# Patient Record
(demographics unavailable — no encounter records)

---

## 2022-06-08 NOTE — P.HPIM
History of Present Illness


H&P Date: 06/08/22





The patient is a 47-year-old male with no known PMH who presents to the 

emergency room for right hand pain and swelling after a cat bite. The patient 

reports that his pet cat of several years bit him on his right hand yesterday at

around 8 PM.  He initially had mild soreness in the area but didn't think much 

of it and washed the wound.  He woke up this morning to gradually worsening 

right hand pain, and redness extending in a streak up to his armpit.  He reports

8 out of 10 pain at the right hand.  Denies fever, chest pain, shortness of 

breath, nausea, vomiting.  Reports no history of cat bites in the past.  

Laboratory evaluation was remarkable for leukocytosis of 13.2.





Review of systems:


Pertinent positives and negatives as discussed in HPI, a complete review of 

systems was performed and all other systems are negative.





Physical examination:


General: non toxic, no distress, appears at stated age, normal weight


Derm: Right hand 2 small puncture wounds at the base of the thumb with kobi

rounding swelling and erythema with streak-like extension up to armpit, warm


Head: atraumatic, normocephalic, symmetric


Eyes: EOMI, no lid lag, anicteric sclera, pupils equal round reactive to light


ENT: Nose and ears atraumatic


Neck: No cervical lymphadenopathy, trachea midline, supple


Mouth: no lip lesion, mucus membranes moist


Cardiovascular: S1S2 reg, no murmur, positive dorsalis pedis pulse bilateral, no

edema


Lungs: CTA bilateral, no rhonchi, no rales, no accessory muscle use


Abdominal: soft,  nontender to palpation, no guarding


Ext: muscle strength 5 out of 5 in all 4 extremities grossly, no gross muscle 

atrophy, no contractures, 


Neuro:  CN II-XI grossly intact, no gross focal neuro deficits


Psych: Alert, oriented, appropriate affect 





Assessment/plan





Right hand cat bite with subsequent cellulitis and lymphangitis


-Continue with Unasyn 3 g every 6 hours


-Follow up blood cultures


-Pain control





DVT prophylaxis


-Heparin subcu





The patient is admitted with an anticipated less than 2 midnight stay for 

evaluation of cat bite


CODE STATUS: Full Code


Discussed with: Patient


Anticipated discharge date: in am


Anticipated discharge place: Home








Past Medical History


Past Medical History: No Reported History


History of Any Multi-Drug Resistant Organisms: None Reported, MRSA


Date of last positivie culture/infection: thigh


MDRO Source:: 2010


Past Surgical History: No Surgical Hx Reported


Past Psychological History: No Psychological Hx Reported


Smoking Status: Never smoker


Past Alcohol Use History: None Reported


Past Drug Use History: None Reported





- Past Family History


  ** Father


Family Medical History: Cancer





Medications and Allergies


                                    Allergies











Allergy/AdvReac Type Severity Reaction Status Date / Time


 


No Known Allergies Allergy   Verified 06/08/22 18:08














Physical Exam


Vitals: 


                                   Vital Signs











  Temp Pulse Resp BP Pulse Ox


 


 06/08/22 20:43   51 L  18  131/81  99


 


 06/08/22 15:19  98.5 F  65  16  122/76  96








                                Intake and Output











 06/08/22 06/08/22 06/08/22





 06:59 14:59 22:59


 


Other:   


 


  Weight   74.843 kg














Results


CBC & Chem 7: 


                                 06/08/22 17:09





                                 06/08/22 17:09


Labs: 


                  Abnormal Lab Results - Last 24 Hours (Table)











  06/08/22 06/08/22 Range/Units





  17:09 17:09 


 


WBC  13.2 H   (3.8-10.6)  k/uL


 


Neutrophils #  10.0 H   (1.3-7.7)  k/uL


 


Sodium   135 L  (137-145)  mmol/L

## 2022-06-08 NOTE — ED
Animal Bite HPI





- General


Chief Complaint: Animal Bite


Stated Complaint: cat bite to arm


Time Seen by Provider: 22 17:06


Source: patient, RN notes reviewed


Mode of arrival: ambulatory


Limitations: no limitations





- History of Present Illness


Initial Comments: 


This is a 47-year-old male who presents to the emergency department for a cat 

bite to the right hand.  Patient states that he was at his neighbor's house 

yesterday, when a dog was walking by and began to bark, causing the cat to bite 

his right hand and arm.  He is unsure if the neighbor's cat has been vaccinated.

 Patient also notes that he has been feeling lightheaded and has had a headache 

since this morning.  Describes the headache as being on both sides of his head. 

Denies any history of migraines.  Denies any visual changes or auras.  States 

that the headache and lightheadedness were the main reasons he came to the 

emergency department.





Denies any fevers, chills, sore throat, cough, dyspnea, chest pain, 

palpitations, abdominal pain, nausea, vomiting, diarrhea, or back pain.





MD Complaint: animal bite


Onset/Timin


-: days(s)


Right: Hand


Animal: cat


Description: household pet, immunizations unknown


Mechanism: bite


Associated Symptoms: erythema, rash, headache





- Related Data


                                    Allergies











Allergy/AdvReac Type Severity Reaction Status Date / Time


 


No Known Allergies Allergy   Verified 22 18:08














Review of Systems


ROS Statement: 


Those systems with pertinent positive or pertinent negative responses have been 

documented in the HPI.





ROS Other: All systems not noted in ROS Statement are negative.





Past Medical History


Past Medical History: No Reported History


History of Any Multi-Drug Resistant Organisms: None Reported, MRSA


Date of last positivie culture/infection: thigh


MDRO Source:: 


Past Surgical History: No Surgical Hx Reported


Past Psychological History: No Psychological Hx Reported


Smoking Status: Never smoker


Past Alcohol Use History: None Reported


Past Drug Use History: None Reported





General Exam


Limitations: no limitations


General appearance: alert, in no apparent distress


Head exam: Present: atraumatic, normocephalic, normal inspection


Respiratory exam: Present: normal lung sounds bilaterally.  Absent: respiratory 

distress, wheezes, rales, rhonchi, stridor


Cardiovascular Exam: Present: regular rate, normal rhythm, normal heart sounds. 

Absent: systolic murmur, diastolic murmur, rubs, gallop, clicks


Neurological exam: Present: alert, oriented X3, CN II-XII intact


Psychiatric exam: Present: normal affect, normal mood


Skin exam: Present: other (Puncture wound to the medial aspect of the right 

thumb with surrounding swelling and erythema.  The erythema extends proximally 

and terminates at the right axilla.  The erythema is associated with increased 

heat.)





Course


                                   Vital Signs











  22





  15:19 20:43


 


Temperature 98.5 F 


 


Pulse Rate 65 51 L


 


Respiratory 16 18





Rate  


 


Blood Pressure 122/76 131/81


 


O2 Sat by Pulse 96 99





Oximetry  














Medical Decision Making





- Medical Decision Making


This is a 47-year-old male who presents to the emergency department for a cat 

bite.  Lab work revealed an elevated white blood cell count, which is expected 

given the infection.  He was given a dose of Augmentin in the emergency 

department and his tetanus status was updated.  After I was discussing this with

the patient, he inquired about medication to treat his headache.  Patient states

that the headache began earlier today and he reports associated lightheadedness.

 States that the headache and lightheadedness were the main reason he decided to

come in.  Patient given IM Toradol and 6 mg of Decadron.  After further 

discussion with Dr. Zhou and the patient, it was decided that admission would 

be the best option, given that cat bites are prone to antibiotic failure and can

become severe rather quickly.  Patient started on 3 g of IV Unasyn.  Will plan 

to admit the patient for observation.





This case was discussed in detail with the attending ED physician. Presentation,

findings, and treatment plan discussed in detail as well. 





- Lab Data


Result diagrams: 


                                 22 17:22 17:09


                                   Lab Results











  22 Range/Units





  17:09 17: 17:09 


 


WBC  13.2 H    (3.8-10.6)  k/uL


 


RBC  4.84    (4.30-5.90)  m/uL


 


Hgb  14.9    (13.0-17.5)  gm/dL


 


Hct  46.4    (39.0-53.0)  %


 


MCV  95.9    (80.0-100.0)  fL


 


MCH  30.8    (25.0-35.0)  pg


 


MCHC  32.1    (31.0-37.0)  g/dL


 


RDW  12.2    (11.5-15.5)  %


 


Plt Count  223    (150-450)  k/uL


 


MPV  8.0    


 


Neutrophils %  76    %


 


Lymphocytes %  13    %


 


Monocytes %  7    %


 


Eosinophils %  2    %


 


Basophils %  1    %


 


Neutrophils #  10.0 H    (1.3-7.7)  k/uL


 


Lymphocytes #  1.7    (1.0-4.8)  k/uL


 


Monocytes #  0.9    (0-1.0)  k/uL


 


Eosinophils #  0.2    (0-0.7)  k/uL


 


Basophils #  0.1    (0-0.2)  k/uL


 


Sodium   135 L   (137-145)  mmol/L


 


Potassium   4.2   (3.5-5.1)  mmol/L


 


Chloride   104   ()  mmol/L


 


Carbon Dioxide   24   (22-30)  mmol/L


 


Anion Gap   7   mmol/L


 


BUN   12   (9-20)  mg/dL


 


Creatinine   0.79   (0.66-1.25)  mg/dL


 


Est GFR (CKD-EPI)AfAm   >90   (>60 ml/min/1.73 sqM)  


 


Est GFR (CKD-EPI)NonAf   >90   (>60 ml/min/1.73 sqM)  


 


Glucose   94   (74-99)  mg/dL


 


Plasma Lactic Acid Yuval    0.8  (0.7-2.0)  mmol/L


 


Calcium   9.1   (8.4-10.2)  mg/dL


 


Total Bilirubin   0.9   (0.2-1.3)  mg/dL


 


AST   21   (17-59)  U/L


 


ALT   17   (4-49)  U/L


 


Alkaline Phosphatase   48   ()  U/L


 


Total Protein   7.6   (6.3-8.2)  g/dL


 


Albumin   4.5   (3.5-5.0)  g/dL














Disposition


Clinical Impression: 


 Cat bite





Disposition: ADMITTED AS IP TO [facility]

## 2022-06-09 NOTE — P.PN
Subjective


Progress Note Date: 06/09/22





Hospital course:





Patient is a 47-year-old male with no known past medical history.  He presented 

to the emergency department with a chief complaint of swelling and pain to right

and status post cat bite.  Patient reports his cat and dog were fighting and he 

tried to break it up and the cat bit his hand.  who presents to the emergency 

room for right hand pain and swelling after a cat bite. The patient reports that

his pet cat of several years bit him on his right hand yesterday at around 8 PM.

 He initially had mild soreness in the area but didn't think much of it and 

washed the wound.  He woke up this morning to gradually worsening right hand 

pain, and redness extending in a streak up to his armpit.  He reports 8 out of 

10 pain at the right hand.  Denies fever, chest pain, shortness of breath, 

nausea, vomiting.  Reports no history of cat bites in the past.  Laboratory eval

uation was remarkable for leukocytosis of 13.2.





Physical exam:





Patient was evaluated at bedside this morning.  Patient continues to have 

moderate erythema and edema to right hand and thumb.  Secondary to significant  

pain and swelling to MCP joint of R thumb, consult will be placed for Ortho hand

specialist. 





Vital signs reviewed and stable. 


General: Nontoxic, no distress and appears stated age.  


Derm: Skin warm and dry, normal coloration for ethnicity.  Patient with moderate

erythema and edema to right hand and thumb significant pain and swelling to MCP 

joint of R thumb.  patient does have full movement and able to flex and extend 

we'll at this time.  Patient does report significant increase of pain remains 

able to make a fist and move per usual.  Sensation is intact.  


Head: Atraumatic, normocephalic and symmetric.  


Eyes: EOMs intact, no lid lag, and anicteric sclera


Mouth: no lip lesions, mucus membranes moist


Cardiovascular: regular rate and rhythm with normal S1S2, no murmur, positive 

posterior tibial pulses bilaterally, and cap refill < 2 seconds.  


Lungs: Respirations even, regular, and unlabored on room air. Lungs CTA 

bilaterally, no rhonchi, no rales, no wheezing, and no accessory muscle usage. 


Abdominal: soft, nontender to palpation, no guarding, no appreciable 

organomegaly


Ext: ROM intact. No gross muscle atrophy, no edema, no contractures


Neuro: Speech clear, face symmetrical and CN II-XII grossly intact with no noted

focal neuro deficits


Psych: Alert and oriented to person, place, time, and situation. Appropriate and

pleasant affect. 





Assessment and Plan of Care:





Cellulitis right hand and thumb secondary to cat bite


-Continue IV antibiotic Unasyn, plans to transition to oral Augmentin show some 

improvement 


-Consult Ortho-hand specialist for evaluation secondary to moderate swelling of 

MCP joint of R thumb


-Symptomatic care and pain management


-Follow up on blood cultures 








CODE STATUS: Full code


DVT prophylaxis: Heparin


Discussed with: Patient and RN


Anticipated discharge date: Possibly tomorrow pending improvement


Anticipated discharge place: Home


A total of 36 minutes was spent on the care of this complex patient more than 

50% of the time was spent in counseling and care coordination.








I reviewed the documentation as provided by the CURT above, who is the original 

author of this note.  I agree with the documented assessment and plan, with the 

following changes: none





Objective





- Vital Signs


Vital signs: 


                                   Vital Signs











Temp  97.7 F   06/09/22 12:30


 


Pulse  60   06/09/22 12:30


 


Resp  16   06/09/22 12:30


 


BP  128/74   06/09/22 12:30


 


Pulse Ox  100   06/09/22 12:30


 


FiO2      








                                 Intake & Output











 06/08/22 06/09/22 06/09/22





 18:59 06:59 18:59


 


Weight 74.843 kg  74.843 kg














- Labs


CBC & Chem 7: 


                                 06/09/22 09:45





                                 06/08/22 17:09


Labs: 


                  Abnormal Lab Results - Last 24 Hours (Table)











  06/08/22 06/08/22 06/09/22 Range/Units





  17:09 17:09 09:45 


 


WBC  13.2 H   13.0 H  (3.8-10.6)  k/uL


 


Neutrophils #  10.0 H   11.0 H  (1.3-7.7)  k/uL


 


Lymphocytes #    0.9 L  (1.0-4.8)  k/uL


 


Sodium   135 L   (137-145)  mmol/L

## 2022-06-10 NOTE — P.DS
Providers


Date of admission: 


06/08/22 21:37





Expected date of discharge: 06/10/22


Attending physician: 


To Golden MD





Consults: 





                                        





06/09/22 19:11


Consult Physician Routine 


   Consulting Provider: Kendra Gonzalez


   Consult Reason/Comments:  not on call, cat bite to r thumb, per order


   Do you want consulting provider notified?: Yes











Primary care physician: 


Stated None





Hospital Course: 





Discharge Diagnosis:





Cellulitis right hand and thumb secondary to cat bite. Pt received >24 hours of 

IV antibiotics and has shown significant improvement. Pt also seen by orthopedic

specialist and has been recommended to apply warm compresses and follow up in 

their office as needed. Pt medically stable and being discharged home on 

Augmentin 875/125 mg tablets twice daily x 10 days. During assessment this 

morning, pt was educated on importance of completing entire course of 

antibiotics and verbalized understanding. 





Hospital Course: 





Patient is a 47-year-old male with no known past medical history.  He presented 

to the emergency department with a chief complaint of swelling and pain to right

and status post cat bite.  Patient reports his cat and dog were fighting and he 

tried to break it up and the cat bit his hand.  who presents to the emergency 

room for right hand pain and swelling after a cat bite. The patient reports that

his pet cat of several years bit him on his right hand yesterday at around 8 PM.

 He initially had mild soreness in the area but didn't think much of it and 

washed the wound.  He woke up this morning to gradually worsening right hand 

pain, and redness extending in a streak up to his armpit.  He reports 8 out of 

10 pain at the right hand.  Denies fever, chest pain, shortness of breath, 

nausea, vomiting.  Reports no history of cat bites in the past.  Laboratory 

evaluation was remarkable for leukocytosis of 13.2.  Patient was admitted under 

our services for Cellulitis right hand and thumb secondary to cat bite. Pt 

received >24 hours of IV antibiotics and has shown significant improvement. Pt 

also seen by orthopedic specialist and has been recommended to apply warm co

mpresses and follow up in their office as needed. Pt medically stable and being 

discharged home on Augmentin 875/125 mg tablets twice daily x 10 days as well as

Norco for pain management. During assessment this morning, pt was educated on 

importance of completing entire course of antibiotics and verbalized 

understanding.  Patient instructed on applying warm compresses and to return to 

the ER if he has no further improvement or worsening swelling and redness 

returns.





Physical exam:





Vital signs reviewed and stable. 


General: Nontoxic, no distress and appears stated age.  


Derm: Skin warm and dry, normal coloration for ethnicity.  Patient with mild 

erythema and edema to right thumb and minimal swelling/erythema to right hand.  

He continues to have full movement and able to flex and extend thumb and make 

fist without difficulties. 


Head: Atraumatic, normocephalic and symmetric.  


Eyes: EOMs intact, no lid lag, and anicteric sclera


Mouth: no lip lesions, mucus membranes moist


Cardiovascular: regular rate and rhythm with normal S1S2, no murmur, positive 

posterior tibial pulses bilaterally, and cap refill < 2 seconds.  


Lungs: Respirations even, regular, and unlabored on room air. Lungs CTA 

bilaterally, no rhonchi, no rales, no wheezing, and no accessory muscle usage. 


Abdominal: soft, nontender to palpation, no guarding, no appreciable org

anomegaly


Ext: ROM intact. No gross muscle atrophy, no edema, no contractures


Neuro: Speech clear, face symmetrical and CN II-XII grossly intact with no noted

focal neuro deficits


Psych: Alert and oriented to person, place, time, and situation. Appropriate and

pleasant affect. 





A total of 31 minutes of time were spent preparing this complex discharge 

summary.


Pt was discharged on 6/10/22 at 10:23 AM.  














I reviewed the documentation as provided by the CURT above, who is the original 

author of this note.  I agree with the documented assessment and plan, with the 

following changes: none


Patient Condition at Discharge: Stable





Plan - Discharge Summary


New Discharge Prescriptions: 


New


   Amoxic-Pot Clav 875-125Mg [Augmentin 875-125] 1 tab PO Q12HR 10 Days #20 tab


   HYDROcodone/APAP 5-325MG [Norco 5-325] 1 each PO Q4HR PRN 3 Days #18 tab


     PRN Reason: Moderate Pain


Discharge Medication List





Amoxic-Pot Clav 875-125Mg [Augmentin 875-125] 1 tab PO Q12HR 10 Days #20 tab 

06/10/22 [Rx]


HYDROcodone/APAP 5-325MG [Onemo 5-325] 1 each PO Q4HR PRN 3 Days #18 tab 

06/10/22 [Rx]








Follow up Appointment(s)/Referral(s): 


Kendra Gonzalez DO [Doctor of Osteopathic Medicine] - 1 Week


Activity/Diet/Wound Care/Special Instructions: 








Activity:





As tolerated.  Take breaks as needed.





Diet: 





Heart healthy and carb consistent diet. Avoid salts, or foods with hidden salts 

such as canned or boxed foods and frozen dinners. Extra salt makes your heart 

work harder and traps the fluid in your body for longer.





Special Instructions:  





Take all of your medications as directed and remember to keep all of your 

doctor's appointments and follow-up as needed.  





Recommend warm compresses to the thumb.





You may follow up with Dr. Kendra Gonzalez as needed.  Call if symptoms worsen.





Thank you for allowing us to participate in your care, it was truly a pleasure 

having you for our patient!!! 





Pt left without discharge instructions, called and left voicemail on his cell 

phone on where to  his prescriptions as well as instructions on care. 











Discharge Disposition: Left Against Medical Advice

## 2022-06-10 NOTE — P.CNOR
History of Present Illness





- [facility]


Consult date: 06/10/22


Consult reason: joint pain (Right thumb, cat bite.)


History of present illness: 


This is a 47-year-old male who presents to the emergency department for a cat 

bite to the right hand.  Patient states that he was at his neighbor's house on 

06/08/2022, when a dog was walking by and began to bark, causing the cat to bite

his right hand and arm.  He is unsure if the neighbor's cat has been vaccinated.

 He states that the thumb became swollen and red fairly quickly and presented to

the emergency department.  He has had IV antibiotics for at least 24 hours.  He 

states that his pain and swelling are improved.  We are consulted for orthopedic

evaluation.


White blood cell count is slightly elevated at 13.0 with a slight left shift.





Past Medical History


Past Medical History: No Reported History


History of Any Multi-Drug Resistant Organisms: None Reported, MRSA


Year Discovered:: thigh


MDRO Source:: 2010


Past Surgical History: No Surgical Hx Reported


Past Psychological History: No Psychological Hx Reported


Smoking Status: Never smoker


Past Alcohol Use History: None Reported


Past Drug Use History: None Reported





- Past Family History


  ** Father


Family Medical History: Cancer





Medications and Allergies


                                    Allergies











Allergy/AdvReac Type Severity Reaction Status Date / Time


 


No Known Allergies Allergy   Verified 06/08/22 18:08














Physical Examination


This is a 47-year-old male in no acute distress.  He is alert and oriented 3.  

Exam of the right upper extremity reveals mild erythema and mild swelling to the

right thumb.  There are 2 puncture wounds about the dorsal and ulnar aspect of 

the thumb.  He has full range of motion of the thumb with minimal difficulty or 

pain.  There is no drainage from the puncture wounds.  He can make a full fist. 

There is no erythematous streaking up the arm but he does have some tenderness 

in the inner upper arm and axilla.  There is a small palpable lymph node in the 

axilla.  Neurovascular status to the upper extremity is intact.








Results





- Labs


Labs: 


                  Abnormal Lab Results - Last 24 Hours (Table)











  06/09/22 Range/Units





  09:45 


 


WBC  13.0 H  (3.8-10.6)  k/uL


 


Neutrophils #  11.0 H  (1.3-7.7)  k/uL


 


Lymphocytes #  0.9 L  (1.0-4.8)  k/uL








                      Microbiology - Last 24 Hours (Table)











 06/08/22 19:40 Blood Culture - Preliminary





 Blood    No Growth after 24 hours


 


 06/08/22 17:04 Blood Culture - Preliminary





 Blood    No Growth after 24 hours








                                      H & H











  06/08/22 06/09/22 Range/Units





  17:09 09:45 


 


Hgb  14.9  14.9  (13.0-17.5)  gm/dL


 


Hct  46.4  46.6  (39.0-53.0)  %











Result Diagrams: 


                                 06/09/22 09:45





                                 06/08/22 17:09





Assessment and Plan


(1) Acute lymphangitis of right upper limb


Current Visit: Yes   Status: Acute   Code(s): L03.123 - ACUTE LYMPHANGITIS OF 

RIGHT UPPER LIMB   SNOMED Code(s): 1347365


   





(2) Cat bite


Current Visit: Yes   Status: Acute   Code(s): W55.01XA - BITTEN BY CAT, INITIAL 

ENCOUNTER   SNOMED Code(s): 782026321


   


Plan: 


The clinical and laboratory findings are discussed with the patient and with 

internal medicine.  He has been on IV antibiotics for at least 24 hours and it 

has shown improvement.  There is no surgical indication at this time.  I will 

defer to internal medicine regarding discharge on oral antibiotics.  I would 

recommend a warm compress to the thumb.  He may follow up with our office as 

needed.